# Patient Record
(demographics unavailable — no encounter records)

---

## 2025-03-31 NOTE — HISTORY OF PRESENT ILLNESS
[de-identified] : Patient presents today stating that he has difficulty hearing and intermittent tinnitus. He states that he frequently feels like his ears are clogged or he has water in his ears. He states that he gets dizzy if he turns his head while laying down. He denies loud noise exposure. He denies having a history pf head trauma or being knocked unconscious. He states that he has a family history of hearing loss.

## 2025-03-31 NOTE — ADDENDUM
[FreeTextEntry1] :  Documented by Emil Bustillo acting as scribe for Dr. Suarez on 03/31/2025. All Medical record entries made by the Scribe were at my, Dr. Suarez, direction and personally dictated by me on 03/31/2025 . I have reviewed the chart and agree that the record accurately reflects my personal performance of the history, physical exam, assessment and plan. I have also personally directed, reviewed, and agreed with the discharge instructions.

## 2025-03-31 NOTE — PHYSICAL EXAM
[Hearing Noel Test (Tuning Fork On Forehead)] : no lateralization of tone [] : septum deviated to the right [Midline] : trachea located in midline position [Normal] : no rashes [Hearing Loss Right Only] : normal [Hearing Loss Left Only] : normal [FreeTextEntry8] : minimal cerumen removed via curettage [FreeTextEntry9] : minimal cerumen removed via curettage [de-identified] : negative martha maneuver [de-identified] : inflamed and swollen turbinates bilaterally [de-identified] : class 3 cryptic tonsils [FreeTextEntry2] : sinuses nontender to percussion. sensations intact

## 2025-03-31 NOTE — CONSULT LETTER
[Dear  ___] : Dear  [unfilled], [Consult Letter:] : I had the pleasure of evaluating your patient, [unfilled]. [Please see my note below.] : Please see my note below. [Consult Closing:] : Thank you very much for allowing me to participate in the care of this patient.  If you have any questions, please do not hesitate to contact me. [Sincerely,] : Sincerely, [FreeTextEntry3] :  Jaylan Suarez MD FACS

## 2025-03-31 NOTE — ASSESSMENT
[FreeTextEntry1] : Reviewed and reconciled medications, allergies, PMHx, PSHx, SocHx, FMHx.  Patient presents today stating that he has difficulty hearing and intermittent tinnitus. He states that he frequently feels like his ears are clogged or he has water in his ears. He states that he gets dizzy if he turns his head while laying down. He denies loud noise exposure. He denies having a history pf head trauma or being knocked unconscious. He states that he has a family history of hearing loss.  Physical exam: -right ear canal: minimal cerumen removed via curettage -left ear canal: minimal cerumen removed via curettage -no lateralization to tuning forks -deviated septum right -inflamed and swollen turbinates bilaterally -negative martha maneuver -class 3 cryptic tonsils  Audio 3/31/25: -Type A Tymp AU -ETF Normal AU -Hearing -8000 Hz with mild SNHL @ 1365-7793 Hz AU -100% discrimination at 60 dB AU -right TPP: -18 -left TPP: -28  Plan: Audio - results interpreted by Dr. Suarez and reviewed with the patient. -Retrieve previous audiograms -FU in 6 months